# Patient Record
Sex: FEMALE | Race: OTHER
[De-identification: names, ages, dates, MRNs, and addresses within clinical notes are randomized per-mention and may not be internally consistent; named-entity substitution may affect disease eponyms.]

---

## 2019-07-23 ENCOUNTER — HOSPITAL ENCOUNTER (EMERGENCY)
Dept: HOSPITAL 8 - ED | Age: 64
Discharge: HOME | End: 2019-07-23
Payer: MEDICAID

## 2019-07-23 VITALS — SYSTOLIC BLOOD PRESSURE: 136 MMHG | DIASTOLIC BLOOD PRESSURE: 85 MMHG

## 2019-07-23 VITALS — BODY MASS INDEX: 28.56 KG/M2 | HEIGHT: 62 IN | WEIGHT: 155.21 LBS

## 2019-07-23 DIAGNOSIS — M19.90: ICD-10-CM

## 2019-07-23 DIAGNOSIS — J06.9: Primary | ICD-10-CM

## 2019-07-23 PROCEDURE — 71046 X-RAY EXAM CHEST 2 VIEWS: CPT

## 2019-07-23 PROCEDURE — 99283 EMERGENCY DEPT VISIT LOW MDM: CPT

## 2019-07-23 NOTE — NUR
TASK RN: PT TO ED W/ CO PRODUCTIVE COUGH X THREE DAYS. +CP WITH COUGHING. 
FREQUENT DRY COUGH NOTED IN TRIAGE. RESPIRATIONS EVEN AND UNLABORED; PT 
AFEBRILE IN TRIAGE. SPO2 >90% ON RA.